# Patient Record
Sex: FEMALE | Race: WHITE | NOT HISPANIC OR LATINO | Employment: FULL TIME | URBAN - NONMETROPOLITAN AREA
[De-identification: names, ages, dates, MRNs, and addresses within clinical notes are randomized per-mention and may not be internally consistent; named-entity substitution may affect disease eponyms.]

---

## 2023-12-27 ENCOUNTER — OFFICE VISIT (OUTPATIENT)
Dept: URGENT CARE | Facility: CLINIC | Age: 63
End: 2023-12-27
Payer: COMMERCIAL

## 2023-12-27 VITALS
DIASTOLIC BLOOD PRESSURE: 60 MMHG | TEMPERATURE: 97.8 F | RESPIRATION RATE: 17 BRPM | HEART RATE: 98 BPM | SYSTOLIC BLOOD PRESSURE: 118 MMHG | OXYGEN SATURATION: 99 %

## 2023-12-27 DIAGNOSIS — H65.02 ACUTE SEROUS OTITIS MEDIA OF LEFT EAR, RECURRENCE NOT SPECIFIED: Primary | ICD-10-CM

## 2023-12-27 PROCEDURE — 99203 OFFICE O/P NEW LOW 30 MIN: CPT

## 2023-12-27 PROCEDURE — S9088 SERVICES PROVIDED IN URGENT: HCPCS

## 2023-12-27 RX ORDER — AMOXICILLIN 875 MG/1
875 TABLET, COATED ORAL 2 TIMES DAILY
Qty: 14 TABLET | Refills: 0 | Status: SHIPPED | OUTPATIENT
Start: 2023-12-27 | End: 2024-01-03

## 2023-12-27 RX ORDER — LISINOPRIL 20 MG/1
20 TABLET ORAL DAILY
COMMUNITY
Start: 2023-11-20

## 2023-12-27 RX ORDER — ATORVASTATIN CALCIUM 80 MG/1
80 TABLET, FILM COATED ORAL DAILY
COMMUNITY
Start: 2023-11-01

## 2023-12-27 NOTE — PATIENT INSTRUCTIONS
Take prescribed antibiotic as instructed.  Take with food avoid upset stomach.  Probiotic supplement daily.  Flonase over-the-counter twice daily.  May continue with Xyzal.  Ear massaging as instructed to help promote drainage of eustachian tubes.  If no improvement, follow-up with family doctor.  Go to the emergency room if symptoms worsen.  Follow up with PCP in 3-5 days.  Proceed to  ER if symptoms worsen.  Fluid In The Ear (Serous Otitis Media)   WHAT YOU NEED TO KNOW:   DOUG is fluid trapped in the middle of your ear behind your eardrum. This condition usually develops without signs or symptoms of an ear infection. Serous otitis media may be caused by an upper respiratory infection or allergies. It is most common in the fall and early spring.       DISCHARGE INSTRUCTIONS:   Call your doctor if:   You develop severe ear pain.    The outside of your ear is red or swollen.    You have fluid coming from your ear.    You have questions or concerns about your condition or care.    How to stay healthy:   Wash your hands often throughout the day.  Use soap and water. Rub your soapy hands together, lacing your fingers, for at least 20 seconds. Rinse with warm, running water. Dry your hands with a clean towel or paper towel. Use hand  that contains alcohol if soap and water are not available. Teach children how to wash their hands and use hand .         Avoid people who are sick.  Some germs are easily and quickly spread through contact.    Follow up with your doctor as directed:  Write down your questions so you remember to ask them during your visits.   © Copyright [employer] 2023 Information is for End User's use only and may not be sold, redistributed or otherwise used for commercial purposes.  The above information is an  only. It is not intended as medical advice for individual conditions or treatments. Talk to your doctor, nurse or pharmacist before following any medical regimen to  see if it is safe and effective for you.

## 2023-12-27 NOTE — PROGRESS NOTES
"  [facility]        NAME: Elizabeth Roman is a 63 y.o. female  : 1960    MRN: [id number]  DATE: 2023  TIME: 8:29 AM    Assessment and Plan   Acute serous otitis media of left ear, recurrence not specified [H65.02]  1. Acute serous otitis media of left ear, recurrence not specified  amoxicillin (AMOXIL) 875 mg tablet        10 days of left earache with pressure and feeling like she is \"underwater\".  There is bulging with minimal erythema.  Will start on amoxicillin.  Advised follow-up with family doctor.  Advised to go to the ER if any symptoms worsen.    Patient Instructions     Take prescribed antibiotic as instructed.  Take with food avoid upset stomach.  Probiotic supplement daily.  Flonase over-the-counter twice daily.  May continue with Xyzal.  Ear massaging as instructed to help promote drainage of eustachian tubes.  If no improvement, follow-up with family doctor.  Go to the emergency room if symptoms worsen.  Follow up with PCP in 3-5 days.  Proceed to  ER if symptoms worsen.    Chief Complaint     Chief Complaint   Patient presents with    Earache     Started 10 days ago  Left earache, reports that when in the shower she got water in the left ear         History of Present Illness       63-year-old female presents to the clinic with left earache that began about 10 days ago.  PT states that it started when she was showering got some water in her ear.  She has been taking Xyzal.  Denies having issues here before.  Denies any fever, chills, chest pain, shortness of breath, sore throat, abdominal pain.    Earache   Pertinent negatives include no ear discharge or sore throat.       Review of Systems   Review of Systems   Constitutional: Negative.    HENT:  Positive for ear pain. Negative for congestion, ear discharge, sinus pressure and sore throat.    Respiratory: Negative.     Cardiovascular: Negative.    Gastrointestinal: Negative.    Musculoskeletal: Negative.    Neurological: " Negative.          Current Medications       Current Outpatient Medications:     amoxicillin (AMOXIL) 875 mg tablet, Take 1 tablet (875 mg total) by mouth 2 (two) times a day for 7 days, Disp: 14 tablet, Rfl: 0    atorvastatin (LIPITOR) 80 mg tablet, Take 80 mg by mouth daily, Disp: , Rfl:     lisinopril (ZESTRIL) 20 mg tablet, Take 20 mg by mouth daily, Disp: , Rfl:     Current Allergies     Allergies as of 12/27/2023    (No Known Allergies)            The following portions of the patient's history were reviewed and updated as appropriate: allergies, current medications, past family history, past medical history, past social history, past surgical history and problem list.     Past Medical History:   Diagnosis Date    Hyperlipidemia     Hypertension        No past surgical history on file.    No family history on file.      Medications have been verified.        Objective   /60   Pulse 98   Temp 97.8 °F (36.6 °C)   Resp 17   SpO2 99%        Physical Exam     Physical Exam  Constitutional:       General: She is not in acute distress.     Appearance: Normal appearance. She is not ill-appearing, toxic-appearing or diaphoretic.   HENT:      Head: Normocephalic and atraumatic.      Right Ear: Tympanic membrane, ear canal and external ear normal.      Left Ear: Ear canal and external ear normal. No drainage, swelling or tenderness. A middle ear effusion is present. There is no impacted cerumen. No mastoid tenderness. Tympanic membrane is erythematous (minimal) and bulging. Tympanic membrane is not perforated.      Nose: Nose normal.      Mouth/Throat:      Mouth: Mucous membranes are moist.      Pharynx: Oropharynx is clear. No posterior oropharyngeal erythema.   Eyes:      Extraocular Movements: Extraocular movements intact.      Conjunctiva/sclera: Conjunctivae normal.      Pupils: Pupils are equal, round, and reactive to light.   Cardiovascular:      Rate and Rhythm: Normal rate and regular rhythm.       Pulses: Normal pulses.      Heart sounds: Normal heart sounds.   Pulmonary:      Effort: Pulmonary effort is normal. No respiratory distress.      Breath sounds: Normal breath sounds.   Lymphadenopathy:      Cervical: No cervical adenopathy.   Skin:     General: Skin is warm and dry.      Capillary Refill: Capillary refill takes less than 2 seconds.      Findings: No rash.   Neurological:      General: No focal deficit present.      Mental Status: She is alert and oriented to person, place, and time. Mental status is at baseline.   Psychiatric:         Mood and Affect: Mood normal.

## 2024-08-25 ENCOUNTER — OFFICE VISIT (OUTPATIENT)
Dept: URGENT CARE | Facility: CLINIC | Age: 64
End: 2024-08-25
Payer: COMMERCIAL

## 2024-08-25 VITALS
DIASTOLIC BLOOD PRESSURE: 87 MMHG | HEART RATE: 68 BPM | TEMPERATURE: 97.7 F | SYSTOLIC BLOOD PRESSURE: 121 MMHG | RESPIRATION RATE: 18 BRPM | OXYGEN SATURATION: 100 %

## 2024-08-25 DIAGNOSIS — L25.9 CONTACT DERMATITIS, UNSPECIFIED CONTACT DERMATITIS TYPE, UNSPECIFIED TRIGGER: Primary | ICD-10-CM

## 2024-08-25 PROCEDURE — S9088 SERVICES PROVIDED IN URGENT: HCPCS

## 2024-08-25 PROCEDURE — 99213 OFFICE O/P EST LOW 20 MIN: CPT

## 2024-08-25 RX ORDER — FAMOTIDINE 20 MG/1
TABLET, FILM COATED ORAL
COMMUNITY
Start: 2024-06-21

## 2024-08-25 RX ORDER — TRIAMCINOLONE ACETONIDE 1 MG/G
CREAM TOPICAL 2 TIMES DAILY
Qty: 15 G | Refills: 0 | Status: SHIPPED | OUTPATIENT
Start: 2024-08-25

## 2024-08-25 RX ORDER — CALCIUM CARBONATE/VITAMIN D3 600 MG-10
2 TABLET ORAL DAILY
COMMUNITY
Start: 2024-05-03

## 2024-08-25 NOTE — PATIENT INSTRUCTIONS
Take prescribed medication as instructed.  Tylenol for pain or fever.  Avoid scratching.  May use Benadryl cream or calamine lotion over-the-counter to help with itching.  Follow-up with your family doctor if no improvement.  Follow up with PCP in 3-5 days.  Proceed to  ER if symptoms worsen.    If tests are performed, our office will contact you with results only if changes need to made to the care plan discussed with you at the visit. You can review your full results on [facility].  Patient Education     Contact dermatitis   The Basics   Written by the doctors and editors at [facility]   What is dermatitis? -- Dermatitis is a type of skin rash that can happen after your skin touches something that irritates it or something you are allergic to.  Things that irritate the skin can be found in products that you use every day, such as soaps or cleansers. Some of the things that can cause skin allergies include:   Certain medicines, perfumes, or cosmetics   The metal in some kinds of jewelry   Plants, such as poison ivy and poison oak  Sometimes, you can develop a rash the first time you touch something. But it is also possible to get a rash from something that you have used before without any problems.  What other symptoms should I watch for? -- If you have a rash, your skin might be dry, itchy, or cracked (picture 1). In people with light skin, the rash is often red. In people with darker skin, it might appear purple, brown, gray, or black (picture 2). If your rash is caused by an allergy, you might also have some swelling or blisters where you have the rash.  Severe symptoms include:   Pain   Widespread swelling   Blisters, oozing, or crusting of the skin  Is there anything I can do on my own? -- Yes. You can:   Avoid using or touching whatever might have caused your rash.   Protect your skin from anything that might irritate it or cause an allergy. For example, wear gloves if you need to work with harsh  soaps.   Use cool or warm water, not hot, for baths and showers. You can also try a special kind of bath called an oatmeal bath.   Try using soothing skin products to help with the itching and discomfort. Examples include thick moisturizing cream or petroleum jelly. Put this on your skin right after you get out of the bath or shower and after washing your hands.   Avoid scratching your skin. It might help to:   Wear cotton gloves at night.   Keep your nails short and clean.   Cover the parts of your skin that itch.  How are skin rashes treated? -- Your doctor might prescribe different treatments or medicines to help your rash heal. These can include:   Steroid creams and ointments - These go on the skin, and they relieve itching and redness.   Steroid pills - You might need to take these for a short time if your rash is severe. But your doctor or nurse will want to take you off of the steroid pills as soon as possible. Even though these medicines help, they can also cause problems of their own.   Wet or damp dressings - These can be helpful for skin that is crusting or oozing. To use a wet or damp dressing, you need to wear 2 layers of clothing. First, put on a layer of damp cotton clothes over your rash. Then, put on a layer of dry clothes on top of the damp ones. People who need these dressings often wear them at night when they sleep.  When should I call the doctor? -- Call your doctor or nurse for advice if:   You have a rash that does not go away within 2 weeks.   Your rash gets worse or spreads over large parts of your body.   You have signs of infection like swelling, warmth, pain, or fever.  All topics are updated as new evidence becomes available and our peer review process is complete.  This topic retrieved from [employer] on: Feb 26, 2024.  Topic 74494 Version 12.0  Release: 32.2.4 - C32.56  © 2024 UpToDate, Inc. and/or its affiliates. All rights reserved.  picture 1: Chronic irritant contact dermatitis      If you have dermatitis, your skin might be red, dry, itchy, or cracked.  Graphic 881458 Version 2.0  picture 2: Dermatitis caused by nickel allergy     This person has an allergy to nickel, a type of metal. They have dermatitis where the button of their jeans touched their skin.  Graphic 382664 Version 1.0  Consumer Information Use and Disclaimer   Disclaimer: This generalized information is a limited summary of diagnosis, treatment, and/or medication information. It is not meant to be comprehensive and should be used as a tool to help the user understand and/or assess potential diagnostic and treatment options. It does NOT include all information about conditions, treatments, medications, side effects, or risks that may apply to a specific patient. It is not intended to be medical advice or a substitute for the medical advice, diagnosis, or treatment of a health care provider based on the health care provider's examination and assessment of a patient's specific and unique circumstances. Patients must speak with a health care provider for complete information about their health, medical questions, and treatment options, including any risks or benefits regarding use of medications. This information does not endorse any treatments or medications as safe, effective, or approved for treating a specific patient. UpToDate, Inc. and its affiliates disclaim any warranty or liability relating to this information or the use thereof.The use of this information is governed by the Terms of Use, available at https://www.woltersPsykosoftuwer.com/en/know/clinical-effectiveness-terms. 2024© UpToDate, Inc. and its affiliates and/or licensors. All rights reserved.  Copyright   © 2024 UpToDate, Inc. and/or its affiliates. All rights reserved.

## 2024-08-25 NOTE — PROGRESS NOTES
[facility]        NAME: Elizabeth Roman is a 64 y.o. female  : 1960    MRN: [id number]  DATE: 2024  TIME: 8:50 AM    Assessment and Plan   Contact dermatitis, unspecified contact dermatitis type, unspecified trigger [L25.9]  1. Contact dermatitis, unspecified contact dermatitis type, unspecified trigger  triamcinolone (KENALOG) 0.1 % cream        Patient states that a caterpillar fell from a tree while sitting on her back patio and was crawling on the side of her neck.  Now has a rash in that area.  Slightly erythematous.  It is not vesicular or pustular.  It is itchy per patient.  Will give trial of Kenalog cream.  Advised follow-up with family doctor.  Advised to go to the ER if any symptoms worsen.    Patient Instructions     Take prescribed medication as instructed.  Tylenol for pain or fever.  Avoid scratching.  May use Benadryl cream or calamine lotion over-the-counter to help with itching.  Follow-up with your family doctor if no improvement.  Follow up with PCP in 3-5 days.  Proceed to  ER if symptoms worsen.    If tests are performed, our office will contact you with results only if changes need to made to the care plan discussed with you at the visit. You can review your full results on [facility].    Chief Complaint     Chief Complaint   Patient presents with    Rash     Had a caterpillar crawling on her yesterday  Now has rash on neck         History of Present Illness       64-year-old female presents the clinic for a rash on the right side of her neck that is very itchy.  Patient states that a hickory tussock moth caterpillar fell from a tree near her back deck and landed on her neck.  Patient denies any known allergies.  Did not take anything over-the-counter.  Denies any difficulty breathing/swallowing.    Rash        Review of Systems   Review of Systems   Constitutional: Negative.    Respiratory: Negative.     Cardiovascular: Negative.    Skin:  Positive for  rash.   Neurological: Negative.          Current Medications       Current Outpatient Medications:     atorvastatin (LIPITOR) 80 mg tablet, Take 80 mg by mouth daily, Disp: , Rfl:     Calcium Carb-Cholecalciferol 600-10 MG-MCG TABS, Take 2 tablets by mouth daily, Disp: , Rfl:     famotidine (PEPCID) 20 mg tablet, , Disp: , Rfl:     lisinopril (ZESTRIL) 20 mg tablet, Take 20 mg by mouth daily, Disp: , Rfl:     triamcinolone (KENALOG) 0.1 % cream, Apply topically 2 (two) times a day, Disp: 15 g, Rfl: 0    Current Allergies     Allergies as of 2024    (No Known Allergies)            The following portions of the patient's history were reviewed and updated as appropriate: allergies, current medications, past family history, past medical history, past social history, past surgical history and problem list.     Past Medical History:   Diagnosis Date    Hyperlipidemia     Hypertension        Past Surgical History:   Procedure Laterality Date    ANKLE FUSION       SECTION         No family history on file.      Medications have been verified.        Objective   /87   Pulse 68   Temp 97.7 °F (36.5 °C)   Resp 18   SpO2 100%        Physical Exam     Physical Exam  Constitutional:       General: She is not in acute distress.     Appearance: Normal appearance. She is not ill-appearing, toxic-appearing or diaphoretic.   HENT:      Head: Normocephalic and atraumatic.      Mouth/Throat:      Mouth: Mucous membranes are moist.      Pharynx: Oropharynx is clear.   Cardiovascular:      Rate and Rhythm: Normal rate and regular rhythm.      Pulses: Normal pulses.      Heart sounds: Normal heart sounds.   Pulmonary:      Effort: Pulmonary effort is normal. No respiratory distress.      Breath sounds: Normal breath sounds. No stridor.   Musculoskeletal:      Cervical back: Normal range of motion. No rigidity or tenderness.   Skin:     General: Skin is warm and dry.      Capillary Refill: Capillary refill takes less  than 2 seconds.      Findings: Rash (Slightly erythematous rash present diffusely on the right side of the neck.  It is not pustular or vesicular.  There is no scaling or scabbing.  Pruritic per patient.  It is not raised.) present. Rash is not crusting, pustular, scaling, urticarial or vesicular.          Neurological:      Mental Status: She is alert and oriented to person, place, and time.

## 2025-01-02 ENCOUNTER — OFFICE VISIT (OUTPATIENT)
Dept: URGENT CARE | Facility: CLINIC | Age: 65
End: 2025-01-02
Payer: COMMERCIAL

## 2025-01-02 VITALS
HEART RATE: 71 BPM | DIASTOLIC BLOOD PRESSURE: 80 MMHG | RESPIRATION RATE: 18 BRPM | OXYGEN SATURATION: 99 % | TEMPERATURE: 98.2 F | SYSTOLIC BLOOD PRESSURE: 139 MMHG

## 2025-01-02 DIAGNOSIS — J01.90 ACUTE SINUSITIS, RECURRENCE NOT SPECIFIED, UNSPECIFIED LOCATION: Primary | ICD-10-CM

## 2025-01-02 PROCEDURE — S9088 SERVICES PROVIDED IN URGENT: HCPCS | Performed by: PHYSICIAN ASSISTANT

## 2025-01-02 PROCEDURE — 99213 OFFICE O/P EST LOW 20 MIN: CPT | Performed by: PHYSICIAN ASSISTANT

## 2025-01-02 NOTE — PATIENT INSTRUCTIONS
Medication as prescribed with food  Vitamin D3 2000 IU daily  Vitamin C 1000mg twice per day  Multivitamin daily  Some studies suggest that Zinc 12.5-15mg every 2 hours while awake x 5 days may shorten the duration cold symptoms by 1-2 days.   Fluids and rest  Nasal saline spray  Over the counter cold medication as needed  Ibuprofen for pain/fever  Salt water gargles and chloraseptic spray  Throat Coat Tea  Warm compresses over sinuses  Steam treatment (utilize proper safety precautions when in contact with hot water/steam)  Sinus Rinses (used distilled water per instructions)  Follow up with PCP in 3-5 days.  Proceed to  ER if symptoms worsen.    If tests have been performed at [facility], our office will contact you with results if changes need to be made to the care plan discussed with you at the visit.  You can review your full results on [facility].    Eat yogurt with live and active cultures and/or take a probiotic at least 3 hours before or after antibiotic dose. Monitor stool for diarrhea and/or blood. If this occurs, contact primary care doctor ASAP.

## 2025-01-02 NOTE — PROGRESS NOTES
[facility]        NAME: Elizabeth Roman is a 64 y.o. female  : 1960    MRN: [id number]  DATE: 2025  TIME: 2:07 PM    Assessment and Plan   Acute sinusitis, recurrence not specified, unspecified location [J01.90]  1. Acute sinusitis, recurrence not specified, unspecified location  amoxicillin-clavulanate (AUGMENTIN) 875-125 mg per tablet          Results        Patient Instructions     Assessment & Plan    Medication as prescribed with food  Vitamin D3 2000 IU daily  Vitamin C 1000mg twice per day  Multivitamin daily  Some studies suggest that Zinc 12.5-15mg every 2 hours while awake x 5 days may shorten the duration cold symptoms by 1-2 days.   Fluids and rest  Nasal saline spray  Over the counter cold medication as needed  Ibuprofen for pain/fever  Salt water gargles and chloraseptic spray  Throat Coat Tea  Warm compresses over sinuses  Steam treatment (utilize proper safety precautions when in contact with hot water/steam)  Sinus Rinses (used distilled water per instructions)  Follow up with PCP in 3-5 days.  Proceed to  ER if symptoms worsen.    If tests have been performed at [facility], our office will contact you with results if changes need to be made to the care plan discussed with you at the visit.  You can review your full results on [facility] My[facility].    Eat yogurt with live and active cultures and/or take a probiotic at least 3 hours before or after antibiotic dose. Monitor stool for diarrhea and/or blood. If this occurs, contact primary care doctor ASAP.       Chief Complaint     Chief Complaint   Patient presents with    Cold Like Symptoms     Pt c/o got a cold , seems to be getting worse- fabiola, cough, itchy sinuses, fever at first.         History of Present Illness     History of Present Illness      URI   This is a new problem. The current episode started 1 to 4 weeks ago. The problem has been gradually worsening. Associated symptoms include congestion, rhinorrhea,  sinus pain and a sore throat. Pertinent negatives include no abdominal pain, coughing, diarrhea, ear pain, headaches, nausea, rash, sneezing, vomiting or wheezing. Treatments tried: vitamin c, decongestant.       Review of Systems   Review of Systems   Constitutional:  Negative for chills, fatigue and fever.   HENT:  Positive for congestion, postnasal drip, rhinorrhea, sinus pressure, sinus pain and sore throat. Negative for ear discharge, ear pain, sneezing and trouble swallowing.    Respiratory:  Negative for cough, chest tightness, shortness of breath and wheezing.    Gastrointestinal:  Negative for abdominal pain, diarrhea, nausea and vomiting.   Musculoskeletal:  Negative for myalgias.   Skin:  Negative for rash.   Neurological:  Negative for light-headedness and headaches.         Current Medications       Current Outpatient Medications:     amoxicillin-clavulanate (AUGMENTIN) 875-125 mg per tablet, Take 1 tablet by mouth every 12 (twelve) hours for 7 days, Disp: 14 tablet, Rfl: 0    atorvastatin (LIPITOR) 80 mg tablet, Take 80 mg by mouth daily, Disp: , Rfl:     Calcium Carb-Cholecalciferol 600-10 MG-MCG TABS, Take 2 tablets by mouth daily, Disp: , Rfl:     famotidine (PEPCID) 20 mg tablet, , Disp: , Rfl:     lisinopril (ZESTRIL) 20 mg tablet, Take 20 mg by mouth daily, Disp: , Rfl:     triamcinolone (KENALOG) 0.1 % cream, Apply topically 2 (two) times a day (Patient not taking: Reported on 1/2/2025), Disp: 15 g, Rfl: 0    Current Allergies     Allergies as of 01/02/2025 - Reviewed 01/02/2025   Allergen Reaction Noted    Erythromycin Diarrhea 04/24/2012            The following portions of the patient's history were reviewed and updated as appropriate: allergies, current medications, past family history, past medical history, past social history, past surgical history and problem list.     Past Medical History:   Diagnosis Date    Hyperlipidemia     Hypertension        Past Surgical History:   Procedure  Laterality Date    ANKLE FUSION       SECTION         History reviewed. No pertinent family history.      Medications have been verified.        Objective   /80   Pulse 71   Temp 98.2 °F (36.8 °C) (Temporal)   Resp 18   SpO2 99%   No LMP recorded.       Physical Exam     Physical Exam  Vitals reviewed.   Constitutional:       General: She is not in acute distress.     Appearance: She is well-developed. She is not diaphoretic.   HENT:      Head: Normocephalic and atraumatic.      Right Ear: Tympanic membrane, ear canal and external ear normal.      Left Ear: Tympanic membrane, ear canal and external ear normal.      Nose: Nose normal.      Mouth/Throat:      Pharynx: No oropharyngeal exudate or posterior oropharyngeal erythema.   Eyes:      General:         Right eye: No discharge.         Left eye: No discharge.   Cardiovascular:      Rate and Rhythm: Normal rate and regular rhythm.      Heart sounds: Normal heart sounds. No murmur heard.     No friction rub. No gallop.   Pulmonary:      Effort: Pulmonary effort is normal. No respiratory distress.      Breath sounds: Normal breath sounds. No wheezing, rhonchi or rales.   Lymphadenopathy:      Cervical: No cervical adenopathy.   Skin:     General: Skin is warm.      Findings: No rash.   Neurological:      Mental Status: She is alert.   Psychiatric:         Behavior: Behavior normal.         Thought Content: Thought content normal.         Judgment: Judgment normal.